# Patient Record
Sex: FEMALE | Race: BLACK OR AFRICAN AMERICAN | Employment: UNEMPLOYED | URBAN - METROPOLITAN AREA
[De-identification: names, ages, dates, MRNs, and addresses within clinical notes are randomized per-mention and may not be internally consistent; named-entity substitution may affect disease eponyms.]

---

## 2020-12-31 ENCOUNTER — HOSPITAL ENCOUNTER (EMERGENCY)
Age: 30
Discharge: HOME | End: 2021-01-01
Attending: EMERGENCY MEDICINE
Payer: MEDICAID

## 2020-12-31 DIAGNOSIS — E87.6 HYPOKALEMIA: ICD-10-CM

## 2020-12-31 DIAGNOSIS — I10 ELEVATED BLOOD PRESSURE READING WITH DIAGNOSIS OF HYPERTENSION: ICD-10-CM

## 2020-12-31 DIAGNOSIS — F12.90 MARIJUANA USE: ICD-10-CM

## 2020-12-31 DIAGNOSIS — F41.1 ANXIOUS REACTION: Primary | ICD-10-CM

## 2020-12-31 PROCEDURE — 99281 EMR DPT VST MAYX REQ PHY/QHP: CPT

## 2020-12-31 PROCEDURE — 96374 THER/PROPH/DIAG INJ IV PUSH: CPT

## 2020-12-31 RX ORDER — LORAZEPAM 2 MG/ML
2 INJECTION INTRAMUSCULAR
Status: COMPLETED | OUTPATIENT
Start: 2020-12-31 | End: 2021-01-01

## 2021-01-01 VITALS
HEART RATE: 102 BPM | TEMPERATURE: 98.8 F | RESPIRATION RATE: 16 BRPM | SYSTOLIC BLOOD PRESSURE: 182 MMHG | DIASTOLIC BLOOD PRESSURE: 111 MMHG

## 2021-01-01 LAB
ANION GAP SERPL CALC-SCNC: 9 MMOL/L (ref 3–18)
BASOPHILS # BLD: 0 K/UL (ref 0–0.1)
BASOPHILS NFR BLD: 0 % (ref 0–2)
BUN SERPL-MCNC: 18 MG/DL (ref 7–18)
BUN/CREAT SERPL: 14 (ref 12–20)
CALCIUM SERPL-MCNC: 9 MG/DL (ref 8.5–10.1)
CHLORIDE SERPL-SCNC: 107 MMOL/L (ref 100–111)
CO2 SERPL-SCNC: 23 MMOL/L (ref 21–32)
CREAT SERPL-MCNC: 1.32 MG/DL (ref 0.6–1.3)
DIFFERENTIAL METHOD BLD: ABNORMAL
EOSINOPHIL # BLD: 0.1 K/UL (ref 0–0.4)
EOSINOPHIL NFR BLD: 0 % (ref 0–5)
ERYTHROCYTE [DISTWIDTH] IN BLOOD BY AUTOMATED COUNT: 13.7 % (ref 11.6–14.5)
ETHANOL SERPL-MCNC: <3 MG/DL (ref 0–3)
GLUCOSE SERPL-MCNC: 159 MG/DL (ref 74–99)
HCT VFR BLD AUTO: 34.7 % (ref 35–45)
HGB BLD-MCNC: 11.6 G/DL (ref 12–16)
LYMPHOCYTES # BLD: 2.3 K/UL (ref 0.9–3.6)
LYMPHOCYTES NFR BLD: 16 % (ref 21–52)
MCH RBC QN AUTO: 26.7 PG (ref 24–34)
MCHC RBC AUTO-ENTMCNC: 33.4 G/DL (ref 31–37)
MCV RBC AUTO: 79.8 FL (ref 74–97)
MONOCYTES # BLD: 0.7 K/UL (ref 0.05–1.2)
MONOCYTES NFR BLD: 5 % (ref 3–10)
NEUTS SEG # BLD: 11.2 K/UL (ref 1.8–8)
NEUTS SEG NFR BLD: 79 % (ref 40–73)
PLATELET # BLD AUTO: 367 K/UL (ref 135–420)
PMV BLD AUTO: 9.9 FL (ref 9.2–11.8)
POTASSIUM SERPL-SCNC: 3.1 MMOL/L (ref 3.5–5.5)
RBC # BLD AUTO: 4.35 M/UL (ref 4.2–5.3)
SODIUM SERPL-SCNC: 139 MMOL/L (ref 136–145)
WBC # BLD AUTO: 14.4 K/UL (ref 4.6–13.2)

## 2021-01-01 PROCEDURE — 85025 COMPLETE CBC W/AUTO DIFF WBC: CPT

## 2021-01-01 PROCEDURE — 80048 BASIC METABOLIC PNL TOTAL CA: CPT

## 2021-01-01 PROCEDURE — 96374 THER/PROPH/DIAG INJ IV PUSH: CPT

## 2021-01-01 PROCEDURE — 82077 ASSAY SPEC XCP UR&BREATH IA: CPT

## 2021-01-01 PROCEDURE — 74011250636 HC RX REV CODE- 250/636: Performed by: EMERGENCY MEDICINE

## 2021-01-01 RX ORDER — POTASSIUM CHLORIDE 1500 MG/1
20 TABLET, FILM COATED, EXTENDED RELEASE ORAL DAILY
Qty: 5 TAB | Refills: 0 | Status: SHIPPED | OUTPATIENT
Start: 2021-01-01 | End: 2021-01-06

## 2021-01-01 RX ADMIN — LORAZEPAM 2 MG: 2 INJECTION INTRAMUSCULAR; INTRAVENOUS at 00:40

## 2021-01-01 NOTE — ED PROVIDER NOTES
EMERGENCY DEPARTMENT HISTORY AND PHYSICAL EXAM    11:32 PM      Date: 2020  Patient Name: Viv Pickard    History of Presenting Illness     No chief complaint on file. History Provided By: Patient, Patient's Mother and EMS    Additional History (Context): Viv Pickard is a 35 y.o. female with hypertension and ADHD who presents with chief complaint of missing her baby who  last year in November she states. Per EMS patient extremely anxious. Patient arrived by ambulance yelling out repeatedly clapping over and over and stating get back devil. Patient states that she is sad and that her baby  last year. Patient denies any suicidal thoughts, homicidal thoughts, hallucination, vomiting, chest pain, cough, fever, and no other complaints. Patient's mother called gave correct patient identifiers, and states that she has a history of hypertension and ADHD but has not been on the ADHD medication in a long time. Patient's mother states that the patient's baby  last November and she may be depressed about that around this time of year. Mother states that she lives in [place] and will come here from [place] to [facility] to get her daughter if needed. Patient's mother, Favio Rivero [phone]     PCP: None        Past History     Past Medical History:  No past medical history on file. Past Surgical History:  No past surgical history on file. Family History:  No family history on file. Social History:  Social History     Tobacco Use    Smoking status: Not on file   Substance Use Topics    Alcohol use: Not on file    Drug use: Not on file       Allergies:  No Known Allergies      Review of Systems       Review of Systems   Constitutional: Negative for chills and fever. HENT: Negative for congestion, rhinorrhea, sore throat and trouble swallowing. Respiratory: Negative for cough and shortness of breath. Cardiovascular: Negative for chest pain.    Gastrointestinal: Negative for abdominal pain, diarrhea, nausea and vomiting. Genitourinary: Negative for difficulty urinating. Musculoskeletal: Negative for arthralgias and neck stiffness. Skin: Negative for pallor and rash. Neurological: Negative for dizziness, weakness, numbness and headaches. Hematological: Does not bruise/bleed easily. Psychiatric/Behavioral: Negative for confusion, dysphoric mood, hallucinations and suicidal ideas. The patient is nervous/anxious. All other systems reviewed and are negative. Physical Exam     Visit Vitals  BP (!) 182/111   Pulse (!) 102   Temp 98.8 °F (37.1 °C)   Resp 16         Physical Exam  Vitals signs and nursing note reviewed. Constitutional:       General: She is not in acute distress. Appearance: She is well-developed. She is not diaphoretic. HENT:      Head: Normocephalic and atraumatic. Eyes:      General: No scleral icterus. Conjunctiva/sclera: Conjunctivae normal.      Pupils: Pupils are equal, round, and reactive to light. Neck:      Musculoskeletal: Normal range of motion and neck supple. Cardiovascular:      Rate and Rhythm: Normal rate. Comments: Capillary refill < 3 seconds  Pulmonary:      Effort: Pulmonary effort is normal. No respiratory distress. Breath sounds: Normal breath sounds. No wheezing. Abdominal:      General: Bowel sounds are normal. There is no distension. Palpations: Abdomen is soft. Tenderness: There is no abdominal tenderness. Musculoskeletal: Normal range of motion. Lymphadenopathy:      Cervical: No cervical adenopathy. Skin:     General: Skin is warm and dry. Neurological:      Mental Status: She is alert and oriented to person, place, and time. Cranial Nerves: No cranial nerve deficit. Psychiatric:         Mood and Affect: Mood is anxious and depressed. Affect is inappropriate. Speech: Speech is rapid and pressured. Behavior: Behavior is hyperactive.  Behavior is not agitated, aggressive or combative. Thought Content: Thought content does not include homicidal or suicidal ideation. Diagnostic Study Results     Labs -  Recent Results (from the past 12 hour(s))   CBC WITH AUTOMATED DIFF    Collection Time: 01/01/21 12:42 AM   Result Value Ref Range    WBC 14.4 (H) 4.6 - 13.2 K/uL    RBC 4.35 4.20 - 5.30 M/uL    HGB 11.6 (L) 12.0 - 16.0 g/dL    HCT 34.7 (L) 35.0 - 45.0 %    MCV 79.8 74.0 - 97.0 FL    MCH 26.7 24.0 - 34.0 PG    MCHC 33.4 31.0 - 37.0 g/dL    RDW 13.7 11.6 - 14.5 %    PLATELET 906 355 - 371 K/uL    MPV 9.9 9.2 - 11.8 FL    NEUTROPHILS 79 (H) 40 - 73 %    LYMPHOCYTES 16 (L) 21 - 52 %    MONOCYTES 5 3 - 10 %    EOSINOPHILS 0 0 - 5 %    BASOPHILS 0 0 - 2 %    ABS. NEUTROPHILS 11.2 (H) 1.8 - 8.0 K/UL    ABS. LYMPHOCYTES 2.3 0.9 - 3.6 K/UL    ABS. MONOCYTES 0.7 0.05 - 1.2 K/UL    ABS. EOSINOPHILS 0.1 0.0 - 0.4 K/UL    ABS. BASOPHILS 0.0 0.0 - 0.1 K/UL    DF AUTOMATED     METABOLIC PANEL, BASIC    Collection Time: 01/01/21 12:42 AM   Result Value Ref Range    Sodium 139 136 - 145 mmol/L    Potassium 3.1 (L) 3.5 - 5.5 mmol/L    Chloride 107 100 - 111 mmol/L    CO2 23 21 - 32 mmol/L    Anion gap 9 3.0 - 18 mmol/L    Glucose 159 (H) 74 - 99 mg/dL    BUN 18 7.0 - 18 MG/DL    Creatinine 1.32 (H) 0.6 - 1.3 MG/DL    BUN/Creatinine ratio 14 12 - 20      GFR est AA 58 (L) >60 ml/min/1.73m2    GFR est non-AA 48 (L) >60 ml/min/1.73m2    Calcium 9.0 8.5 - 10.1 MG/DL   ETHYL ALCOHOL    Collection Time: 01/01/21 12:42 AM   Result Value Ref Range    ALCOHOL(ETHYL),SERUM <3 0 - 3 MG/DL       Radiologic Studies -   No orders to display         Medical Decision Making   I am the first provider for this patient. I reviewed the vital signs, available nursing notes, past medical history, past surgical history, family history and social history. Vital Signs-Reviewed the patient's vital signs.         Records Reviewed: Nursing Notes and Old Medical Records (Time of Review: 11:32 PM)    Provider Notes (Medical Decision Making): DDx: Depression, anxiety, acute psychosis, metabolic    Patient rambling and yelling out, repeatedly clapping and states \"devil get back \"    Get labs, give Ativan, consider crisis evaluation    MDM    Medications   LORazepam (ATIVAN) injection 2 mg (2 mg IntraVENous Given 1/1/21 0040)         ED Course: Progress Notes, Reevaluation, and Consults:    Patient later admits to having some marijuana edibles  Denies SI, HI  She is much more calm now, resting, no complaints, appears comfortable. BP is elevated, but she is asymptomatic. Patient admits that she did not take her blood pressure medication today but has it at home. I stressed to her the importance of taking her blood pressure medication daily as prescribed and explained risks including stroke MI and death, she fully understood and states that she will take her blood pressure medication when she when she gets home and as prescribed. WBC 14, likely stress phase  She is afebrile vital signs stable. Denies any type of symptom to indicate infectious process    Potassium 3.1. We will give her a prescription for potassium    Reassess patient and she is resting comfortably. No complaints    Discussed with her risk of illicit drugs and other drugs that can alter mental state. She understands fully. Diagnosis     Clinical Impression:   1. Anxious reaction    2. Marijuana use    3. Hypokalemia    4.  Elevated blood pressure reading with diagnosis of hypertension        Disposition: Discharged    Follow-up Information     Follow up With Specialties Details Why Contact Info    [facility] MEDICINE  Schedule an appointment as soon as possible for a visit in 3 days  [address] [place] [address]    [address] EMERGENCY DEPT Emergency Medicine  As needed, If symptoms worsen [phone] [id number]  [phone]           Patient's Medications   Start Taking    POTASSIUM CHLORIDE SR (K-TAB) 20 MEQ TABLET    Take 1 Tab by mouth daily for 5 days. Indications: low amount of potassium in the blood   Continue Taking    No medications on file   These Medications have changed    No medications on file   Stop Taking    No medications on file       DO Araceli Gallegos medical dictation software was used for portions of this report. Unintended transcription errors may occur. My signature above authenticates this document and my orders, the final    diagnosis (es), discharge prescription (s), and instructions in the Epic    record.